# Patient Record
Sex: MALE | Race: WHITE | Employment: FULL TIME | URBAN - METROPOLITAN AREA
[De-identification: names, ages, dates, MRNs, and addresses within clinical notes are randomized per-mention and may not be internally consistent; named-entity substitution may affect disease eponyms.]

---

## 2019-03-10 ENCOUNTER — APPOINTMENT (OUTPATIENT)
Dept: MRI IMAGING | Facility: CLINIC | Age: 39
End: 2019-03-10
Attending: NURSE PRACTITIONER
Payer: COMMERCIAL

## 2019-03-10 ENCOUNTER — APPOINTMENT (OUTPATIENT)
Dept: CT IMAGING | Facility: CLINIC | Age: 39
End: 2019-03-10
Attending: EMERGENCY MEDICINE
Payer: COMMERCIAL

## 2019-03-10 ENCOUNTER — HOSPITAL ENCOUNTER (OUTPATIENT)
Facility: CLINIC | Age: 39
Setting detail: OBSERVATION
Discharge: HOME | End: 2019-03-11
Attending: EMERGENCY MEDICINE | Admitting: INTERNAL MEDICINE
Payer: COMMERCIAL

## 2019-03-10 DIAGNOSIS — R47.89 WORD FINDING DIFFICULTY: ICD-10-CM

## 2019-03-10 DIAGNOSIS — G45.9 TIA (TRANSIENT ISCHEMIC ATTACK): Primary | ICD-10-CM

## 2019-03-10 DIAGNOSIS — H53.9 VISUAL DISTURBANCE: ICD-10-CM

## 2019-03-10 LAB
ANION GAP SERPL CALCULATED.3IONS-SCNC: 5 MMOL/L (ref 3–14)
APTT PPP: 25 SEC (ref 22–37)
BASOPHILS # BLD AUTO: 0 10E9/L (ref 0–0.2)
BASOPHILS NFR BLD AUTO: 0.2 %
BUN SERPL-MCNC: 13 MG/DL (ref 7–30)
CALCIUM SERPL-MCNC: 8.8 MG/DL (ref 8.5–10.1)
CHLORIDE SERPL-SCNC: 105 MMOL/L (ref 94–109)
CO2 SERPL-SCNC: 29 MMOL/L (ref 20–32)
CREAT SERPL-MCNC: 0.77 MG/DL (ref 0.66–1.25)
DIFFERENTIAL METHOD BLD: NORMAL
EOSINOPHIL # BLD AUTO: 0.2 10E9/L (ref 0–0.7)
EOSINOPHIL NFR BLD AUTO: 2.4 %
ERYTHROCYTE [DISTWIDTH] IN BLOOD BY AUTOMATED COUNT: 12.8 % (ref 10–15)
GFR SERPL CREATININE-BSD FRML MDRD: >90 ML/MIN/{1.73_M2}
GLUCOSE BLDC GLUCOMTR-MCNC: 87 MG/DL (ref 70–99)
GLUCOSE BLDC GLUCOMTR-MCNC: 91 MG/DL (ref 70–99)
GLUCOSE SERPL-MCNC: 95 MG/DL (ref 70–99)
HBA1C MFR BLD: 5.7 % (ref 0–5.6)
HCT VFR BLD AUTO: 44.7 % (ref 40–53)
HGB BLD-MCNC: 15.8 G/DL (ref 13.3–17.7)
IMM GRANULOCYTES # BLD: 0.1 10E9/L (ref 0–0.4)
IMM GRANULOCYTES NFR BLD: 1.1 %
INR PPP: 1 (ref 0.86–1.14)
INTERPRETATION ECG - MUSE: NORMAL
LYMPHOCYTES # BLD AUTO: 3.2 10E9/L (ref 0.8–5.3)
LYMPHOCYTES NFR BLD AUTO: 32.9 %
MCH RBC QN AUTO: 31.5 PG (ref 26.5–33)
MCHC RBC AUTO-ENTMCNC: 35.3 G/DL (ref 31.5–36.5)
MCV RBC AUTO: 89 FL (ref 78–100)
MONOCYTES # BLD AUTO: 0.7 10E9/L (ref 0–1.3)
MONOCYTES NFR BLD AUTO: 7.3 %
NEUTROPHILS # BLD AUTO: 5.5 10E9/L (ref 1.6–8.3)
NEUTROPHILS NFR BLD AUTO: 56.1 %
NRBC # BLD AUTO: 0 10*3/UL
NRBC BLD AUTO-RTO: 0 /100
PLATELET # BLD AUTO: 238 10E9/L (ref 150–450)
POTASSIUM SERPL-SCNC: 3.9 MMOL/L (ref 3.4–5.3)
RBC # BLD AUTO: 5.01 10E12/L (ref 4.4–5.9)
SODIUM SERPL-SCNC: 139 MMOL/L (ref 133–144)
TROPONIN I SERPL-MCNC: <0.015 UG/L (ref 0–0.04)
TSH SERPL DL<=0.005 MIU/L-ACNC: 2.25 MU/L (ref 0.4–4)
WBC # BLD AUTO: 9.8 10E9/L (ref 4–11)

## 2019-03-10 PROCEDURE — A9585 GADOBUTROL INJECTION: HCPCS | Performed by: INTERNAL MEDICINE

## 2019-03-10 PROCEDURE — 93005 ELECTROCARDIOGRAM TRACING: CPT

## 2019-03-10 PROCEDURE — G0378 HOSPITAL OBSERVATION PER HR: HCPCS

## 2019-03-10 PROCEDURE — 84484 ASSAY OF TROPONIN QUANT: CPT | Performed by: EMERGENCY MEDICINE

## 2019-03-10 PROCEDURE — 00000146 ZZHCL STATISTIC GLUCOSE BY METER IP

## 2019-03-10 PROCEDURE — 84443 ASSAY THYROID STIM HORMONE: CPT | Performed by: EMERGENCY MEDICINE

## 2019-03-10 PROCEDURE — 25000125 ZZHC RX 250: Performed by: EMERGENCY MEDICINE

## 2019-03-10 PROCEDURE — 99214 OFFICE O/P EST MOD 30 MIN: CPT | Performed by: PSYCHIATRY & NEUROLOGY

## 2019-03-10 PROCEDURE — 99220 ZZC INITIAL OBSERVATION CARE,LEVL III: CPT | Performed by: NURSE PRACTITIONER

## 2019-03-10 PROCEDURE — 83036 HEMOGLOBIN GLYCOSYLATED A1C: CPT | Performed by: EMERGENCY MEDICINE

## 2019-03-10 PROCEDURE — 70498 CT ANGIOGRAPHY NECK: CPT

## 2019-03-10 PROCEDURE — 99285 EMERGENCY DEPT VISIT HI MDM: CPT | Mod: 25

## 2019-03-10 PROCEDURE — 70553 MRI BRAIN STEM W/O & W/DYE: CPT

## 2019-03-10 PROCEDURE — 25000132 ZZH RX MED GY IP 250 OP 250 PS 637: Performed by: NURSE PRACTITIONER

## 2019-03-10 PROCEDURE — 25500064 ZZH RX 255 OP 636: Performed by: INTERNAL MEDICINE

## 2019-03-10 PROCEDURE — 25000128 H RX IP 250 OP 636: Performed by: EMERGENCY MEDICINE

## 2019-03-10 PROCEDURE — 85025 COMPLETE CBC W/AUTO DIFF WBC: CPT | Performed by: EMERGENCY MEDICINE

## 2019-03-10 PROCEDURE — 85730 THROMBOPLASTIN TIME PARTIAL: CPT | Performed by: EMERGENCY MEDICINE

## 2019-03-10 PROCEDURE — 70450 CT HEAD/BRAIN W/O DYE: CPT | Mod: 59

## 2019-03-10 PROCEDURE — 85610 PROTHROMBIN TIME: CPT | Performed by: EMERGENCY MEDICINE

## 2019-03-10 PROCEDURE — 80048 BASIC METABOLIC PNL TOTAL CA: CPT | Performed by: EMERGENCY MEDICINE

## 2019-03-10 PROCEDURE — 0042T CT HEAD PERFUSION WITH CONTRAST: CPT

## 2019-03-10 RX ORDER — HYDRALAZINE HYDROCHLORIDE 20 MG/ML
10-20 INJECTION INTRAMUSCULAR; INTRAVENOUS
Status: DISCONTINUED | OUTPATIENT
Start: 2019-03-10 | End: 2019-03-11 | Stop reason: HOSPADM

## 2019-03-10 RX ORDER — ASPIRIN 81 MG/1
81 TABLET ORAL DAILY
Status: DISCONTINUED | OUTPATIENT
Start: 2019-03-10 | End: 2019-03-10

## 2019-03-10 RX ORDER — LABETALOL 20 MG/4 ML (5 MG/ML) INTRAVENOUS SYRINGE
10-40 EVERY 10 MIN PRN
Status: DISCONTINUED | OUTPATIENT
Start: 2019-03-10 | End: 2019-03-11 | Stop reason: HOSPADM

## 2019-03-10 RX ORDER — NALOXONE HYDROCHLORIDE 0.4 MG/ML
.1-.4 INJECTION, SOLUTION INTRAMUSCULAR; INTRAVENOUS; SUBCUTANEOUS
Status: DISCONTINUED | OUTPATIENT
Start: 2019-03-10 | End: 2019-03-11 | Stop reason: HOSPADM

## 2019-03-10 RX ORDER — AMOXICILLIN 250 MG
1-2 CAPSULE ORAL 2 TIMES DAILY PRN
Status: DISCONTINUED | OUTPATIENT
Start: 2019-03-10 | End: 2019-03-11 | Stop reason: HOSPADM

## 2019-03-10 RX ORDER — ASPIRIN 81 MG/1
81 TABLET ORAL DAILY
Status: DISCONTINUED | OUTPATIENT
Start: 2019-03-11 | End: 2019-03-11 | Stop reason: HOSPADM

## 2019-03-10 RX ORDER — FEXOFENADINE HCL 180 MG/1
180 TABLET ORAL DAILY PRN
COMMUNITY

## 2019-03-10 RX ORDER — LANOLIN ALCOHOL/MO/W.PET/CERES
3 CREAM (GRAM) TOPICAL
COMMUNITY

## 2019-03-10 RX ORDER — IOPAMIDOL 755 MG/ML
120 INJECTION, SOLUTION INTRAVASCULAR ONCE
Status: COMPLETED | OUTPATIENT
Start: 2019-03-10 | End: 2019-03-10

## 2019-03-10 RX ORDER — LANOLIN ALCOHOL/MO/W.PET/CERES
3 CREAM (GRAM) TOPICAL
Status: DISCONTINUED | OUTPATIENT
Start: 2019-03-10 | End: 2019-03-11 | Stop reason: HOSPADM

## 2019-03-10 RX ORDER — GADOBUTROL 604.72 MG/ML
9 INJECTION INTRAVENOUS ONCE
Status: COMPLETED | OUTPATIENT
Start: 2019-03-10 | End: 2019-03-10

## 2019-03-10 RX ADMIN — IOPAMIDOL 120 ML: 755 INJECTION, SOLUTION INTRAVENOUS at 16:39

## 2019-03-10 RX ADMIN — SODIUM CHLORIDE 100 ML: 9 INJECTION, SOLUTION INTRAVENOUS at 16:39

## 2019-03-10 RX ADMIN — GADOBUTROL 9 ML: 604.72 INJECTION INTRAVENOUS at 20:08

## 2019-03-10 RX ADMIN — ASPIRIN 325 MG: 325 TABLET, DELAYED RELEASE ORAL at 19:30

## 2019-03-10 ASSESSMENT — MIFFLIN-ST. JEOR
SCORE: 1864.3
SCORE: 1865.66

## 2019-03-10 NOTE — ED NOTES
"[facility]  ED Nurse Handoff Report    ED Chief complaint: Eye Problem (reports he became dizzy and had vision changes along with diifficulty organizing thoughts to answer questions. Onset 1500)      ED Diagnosis:   Final diagnoses:   None       Code Status: Full Code    Allergies: Not on File    Activity level - Baseline/Home:  Independent    Activity Level - Current:   Stand with Assist     Needed?: No    Isolation: No  Infection: Not Applicable  Bariatric?: No    Vital Signs:   Vitals:    03/10/19 1618 03/10/19 1622 03/10/19 1628 03/10/19 1648   BP: (!) 149/97 144/79  137/71   Pulse:  83  87   Resp: 16 15 18 18   Temp: 97.7  F (36.5  C)      TempSrc: Oral      SpO2: 98% 99% 99% 97%   Weight: 96.6 kg (213 lb)      Height: 1.727 m (5' 8\")          Cardiac Rhythm: ,        Pain level: 0-10 Pain Scale: 0    Is this patient confused?: No   Does this patient have a guardian?  No         If yes, is there guardianship documents in the Epic \"Code/ACP\" activity?  N/A         Guardian Notified?  N/A  [place] - Suicide Severity Rating Scale Completed?  Yes  If yes, what color did the patient score?  White    Patient Report: Initial Complaint: vision changes and difficulty organizing thoughts to answer questions  Focused Assessment  James Tran is a 38 year old male who presents to the emergency department today for evaluation of eye problem. The patient reports driving with his partner and suddenly at 1500 became dizzy and had visual disturbance. He describes the disturbance as his vision moving top to bottom constantly. He notes they pulled over and after 30-40 second rest his vision became normal. He went to urgent care and when he was asked  questions he notes he understood the questions very well but could not speak and express his answers. The patient denies any weakness or pain. His symptoms have cleared now, he was sent directly from urgent care to ED where code stroke was initiated. "   Tests performed- labs, head CT, MRI ordered    Abnormal Results:   Results for orders placed or performed during the hospital encounter of 03/10/19   CT Head w/o Contrast    Narrative    Clinical History: Code Stroke. Vision problems. Work finding  difficulty.    Technique: Technique: Noncontrast axial CT images of the head were  obtained. This CT Scan used dose modulation, iterative reconstruction,  and/or weight based dosing when appropriate to reduce radiation dose  to as low as reasonably achievable.    Comparison:  None.     Findings:  The ventricles and sulci are normal.   No parenchymal  lesion is identified . No evidence of hemorrhage, mass or recent  infarction.  The cerebellum and brainstem are unremarkable. The globes  and orbits are unremarkable.  The imaged portions of the paranasal  sinuses and mastoid air cells are clear.  The skull base and calvaria  are intact.        Impression    Impression:  Normal head CT.     Case discussed with Dr. Estes 3/10/20 at 1916:55    OMAR JENKINS MD   Basic metabolic panel   Result Value Ref Range    Sodium 139 133 - 144 mmol/L    Potassium 3.9 3.4 - 5.3 mmol/L    Chloride 105 94 - 109 mmol/L    Carbon Dioxide PENDING 20 - 32 mmol/L    Anion Gap PENDING 3 - 14 mmol/L    Glucose PENDING 70 - 99 mg/dL    Urea Nitrogen PENDING 7 - 30 mg/dL    Creatinine PENDING 0.66 - 1.25 mg/dL    GFR Estimate PENDING >60 mL/min/[1.73_m2]    GFR Estimate If Black PENDING >60 mL/min/[1.73_m2]    Calcium PENDING 8.5 - 10.1 mg/dL   Glucose by meter   Result Value Ref Range    Glucose 91 70 - 99 mg/dL   EKG 12 lead   Result Value Ref Range    Interpretation ECG Click View Image link to view waveform and result        Treatments provided: education provided on stroke protocol    Family Comments: partner at bedside    OBS brochure/video discussed/provided to patient/family: No              Name of person given brochure if not patient: n/a              Relationship to patient: n/a    ED  Medications:   Medications   iopamidol (ISOVUE-370) solution 120 mL (120 mLs Intravenous Given 3/10/19 1639)   100mL Saline flush (100 mLs Intravenous Given 3/10/19 1639)       Drips infusing?:  No    For the majority of the shift this patient was Green.   Interventions performed were n/a.    Severe Sepsis OR Septic Shock Diagnosis Present: No    To be done/followed up on inpatient unit:  possibly MRI if not yet done    ED NURSE PHONE NUMBER: *[id number]

## 2019-03-10 NOTE — ED PROVIDER NOTES
"  History     Chief Complaint:  \"I'm off\"      HPI   James Tran is a 38 year old male who presents to the emergency department today for evaluation of visual disturbance and speech trouble. The patient reports driving with his partner and suddenly at 1500 became dizzy and had visual disturbance, \"like when you're going around a roudabout, . He describes the disturbance as his vision moving top to bottom constantly. He notes they pulled over and after 30-40 second rest his vision improved but did not normalize.   When he sought medical evaluation and they asked him questions, he notes he understood the questions very well but could not speak and express his answers.  He states that he was unable to spell his name, as he felt he could not get the words out.  The patient denies any focal weakness or pain.  No history of heart arrhythmias or history of stroke.  No history of diabetes.  No drugs or alcohol.  This has never happened in the past.  No eye pain.    Cardiac/PE/DVT Risk Factors:  History of hypertension - Negative   History of diabetes - Negative   History of smoking - negative   Personal history of PE/DVT - negative   Personal history of heart complications - negative   Recent travel - negative     Allergies:  No Known Drug Allergies     Medications:    Medications reviewed. No pertinent medications.    Past Medical History:    Past medical history reviewed. No pertinent medical history.     Past Surgical History:    Surgical history reviewed. No pertinent surgical history.     Family History:    Mother: TIAs    Social History:  The patient was accompanied to the ED by wife.  Smoking Status: Never Smoker  Smokeless Tobacco: Never Used  Alcohol Use: Positive  Drug Use: Negative  Marital Status:         Review of Systems   Unable to perform ROS: Acuity of condition       Physical Exam     Patient Vitals for the past 24 hrs:   BP Temp Temp src Pulse Heart Rate Resp SpO2 Height Weight   03/10/19 1829 " "125/74 98.6  F (37  C) Oral 70 70 14 98 % -- --   03/10/19 1800 120/68 -- -- 73 73 13 96 % -- --   03/10/19 1700 133/68 -- -- 75 78 14 98 % -- --   03/10/19 1648 137/71 -- -- 87 86 18 97 % -- --   03/10/19 1628 -- -- -- -- 80 18 99 % -- --   03/10/19 1622 144/79 -- -- 83 81 15 99 % -- --   03/10/19 1618 (!) 149/97 97.7  F (36.5  C) Oral -- 85 16 98 % 1.727 m (5' 8\") 96.6 kg (213 lb)        Physical Exam  General: male semi-recumbent in Stabe 1, female  at bedside  HENT: mucous membranes moist, OP clear, face nontender  Eyes: PERRL, no nystagmus  CV: regular rate, regular rhythm  Resp: clear throughout, normal effort  GI: abdomen soft and nontender, no guarding  MSK: no bony tenderness  Skin: appropriately warm and dry  Neuro: awake, alert, clear speech, fully oriented, face symmetric,  normal, finger-nose normal bilaterally, strength and sensation intact in all extr, no meningismus, ambulation not initially tested  Psych: anxious, cooperative      Emergency Department Course     ECG:  ECG taken at 1617, ECG read at 1620  Normal sinus rhythm  Rate 86 bpm. FL interval 134 ms. QRS duration 94 ms. QT/QTc 390/466 ms. P-R-T axes 57 19 38.    Imaging:  Radiology findings were communicated with the patient who voiced understanding of the findings.    CTA Head Neck with Contrast  1. No carotid or vertebral artery stenosis or dissection.  2. Intracranial circulation is unremarkable.  OMAR JENKINS MD  Reading per radiology     CT Head Perfusion w Contrast  Normal CT perfusion of the brain.  Radiation dose for this scan was reduced using automated exposure  control, adjustment of the mA and/or kV according to patient size, or  iterative reconstruction technique  OMAR JENKINS MD  Reading per radiology    CT Head w/o Contrast  Normal head CT.   Case discussed with Dr. Estes 3/10/20 at 1916:55  OMAR JENKINS MD  Reading per radiology    Laboratory:  Laboratory findings were communicated with the patient who voiced " understanding of the findings.    CBC: WBC 9.8, HGB 15.8,   BMP: o/w WNL (Creatinine 0.77)  INR: 1.00  Partial thromboplastin time: 25  Glucose by meter: 91    Interventions:  1639 ISOVUE 120 ml IV  1639 Saline flush 100 ml IV    Emergency Department Course:    1607 Patient arrived in ED    1615 Nursing notes and vitals reviewed.    1615 I performed an exam of the patient as documented above.     Code stroke activated.    1617 EKG obtained as noted above.     The patient was placed on continuous cardiac and pulse ox monitoring.    1625 Patient rechecked and updated.  Neurology is at Bedside. IV was inserted and blood was drawn for laboratory testing, results above.     1635  The patient was sent for a CT head w/o contrast, CT head perfusion w/ contrast, and CTA head neck w/ contrast while in the emergency department, results above.      1654 I spoke with Dr. Qureshi of the radiology service from [facility] regarding patient's presentation, findings, and plan of care.     1703 Patient rechecked and updated.      1825 I personally reviewed the EKG, laboratory, and imaging results with the patient and answered all related questions prior to admit.    Impression & Plan      Medical Decision Making:  Given very recent onset of focal neurologic symptoms, code stroke was activated.  Prompt imaging was performed though fortunately there is no evidence of structural intracranial process or thrombus on angiography.  Therefore the code stroke was de-escalated, while acknowledging an acute ischemic stroke remains possible.  No signs of major metabolic ab normality.  Symptoms ultimately resolved during his ED course.  Neurology team recommends hospitalization for close monitoring overnight and nonemergent MRI.  He will be hospitalized for further care.    Diagnosiis:    ICD-10-CM    1. Visual disturbance H53.9 Basic metabolic panel     CBC with platelets differential     INR     Partial thromboplastin time   2. Word  finding difficulty R47.89      Disposition:   The patient is admitted into the care of Dr. Henriquez.     Scribe Disclosure:  I, Winston Tiny, am serving as a scribe at 4:18 PM on 3/10/2019 to document services personally performed by Domenico Estes MD based on my observations and the provider's statements to me.    This record was created at least in part using electronic voice recognition software, so please excuse any typographical errors.          EMERGENCY DEPARTMENT       Domenico Estes MD  03/10/19 [id number]

## 2019-03-10 NOTE — PROGRESS NOTES
RECEIVING UNIT ED HANDOFF REVIEW    ED Nurse Handoff Report was reviewed by: Rajeev Gleason on March 10, 2019 at 6:06 PM

## 2019-03-10 NOTE — CONSULTS
[facility]      Stroke Consult Note    Reason for Consult:  Stroke Code    HPI  James Tran is a 38 year old male who presents with vision changes while driving at 3 pm today. He states that the vision was constantly going up and down for about 30 seconds then resolved. He never lost vision during that time. He then felt off for awhile and could not answer questions that people were asking him for about 1 hour. He was able to understand everybody though. He now feels back to normal without any complaints.    He has never had similar symptoms in the past. He denied any weakness, numbness, chest pain, headache.    TPA Treatment   Not given due to minor / isolated / quickly resolving stroke symptoms.    Endovascular Treatment  Not initiated due to absence of proximal vessel occlusion    Impression  Vision changes/trouble speaking ?TIA/Ischemic stroke    Recommendations  -No quite sure by symptomology if this was TIA/Ischemic stroke  -Aspirin 325mg once daily  -Neuro checks  -MRI brain for further evaluation  -TTE, EKG, Tele  -LDL, HbA1C    Patient Follow-up    - final recommendation pending work-up      Please contact the Stroke Service with any questions.    Tiago Wilde MD  Neurology  March 10, 2019    Text Page ([phone])    __________________________________________________    History reviewed. No pertinent past medical history.    History reviewed. No pertinent surgical history.    Medications   No current facility-administered medications for this encounter.      No current outpatient medications on file.         (Not in a hospital admission)    Allergies   Not on File    Family History       Social History   Social History     Socioeconomic History     Marital status:      Spouse name: Not on file     Number of children: Not on file     Years of education: Not on file     Highest education level: Not on file   Occupational History     Not on file   Social Needs     Financial resource strain:  Not on file     Food insecurity:     Worry: Not on file     Inability: Not on file     Transportation needs:     Medical: Not on file     Non-medical: Not on file   Tobacco Use     Smoking status: Never Smoker     Smokeless tobacco: Never Used   Substance and Sexual Activity     Alcohol use: Yes     Comment: social     Drug use: No     Sexual activity: Yes     Partners: Female   Lifestyle     Physical activity:     Days per week: Not on file     Minutes per session: Not on file     Stress: Not on file   Relationships     Social connections:     Talks on phone: Not on file     Gets together: Not on file     Attends Jewish service: Not on file     Active member of club or organization: Not on file     Attends meetings of clubs or organizations: Not on file     Relationship status: Not on file     Intimate partner violence:     Fear of current or ex partner: Not on file     Emotionally abused: Not on file     Physically abused: Not on file     Forced sexual activity: Not on file   Other Topics Concern     Not on file   Social History Narrative     Not on file          ROS  The 10 point Review of Systems is negative other than noted in the HPI or here.     PHYSICAL EXAMINATION  B/P:     (!) 149/97 (03/10/19 1618)    Heart Rate: 85 (03/10/19 1618)        Resp:  16 (03/10/19 1618)  Temp: 97.7  F (36.5  C)   Oral (03/10/19 1618)    General:  patient lying in bed without any acute distress    HEENT:  normocephalic/atraumatic  Cardio:  RRR  Pulmonary:  no respiratory distress  Abdomen:  soft  Extremities:  no edema  Skin:  intact     Neurologic  Mental Status:  fully alert, attentive and oriented, follows commands, speech clear and fluent  Cranial Nerves:  visual fields intact, PERRL, EOMI with normal smooth pursuit, facial sensation intact and symmetric, facial movements symmetric, no dysarthria  Motor:  strength 5/5 throughout upper and lower extremities  Reflexes:  no clonus  Sensory:  intact/symmetric to light touch  and pin prick throughout upper and lower extremities  Coordination:  FNF and HS intact without dysmetria  Station/Gait:  No tested    Stroke Scales    NIHSS  Interval and Comments baseline   1a. Level of Consciousness 0-->Alert, keenly responsive   1b. LOC Questions 0-->Answers both questions correctly   1c. LOC Commands 0-->Performs both tasks correctly   2.   Best Gaze 0-->Normal   3.   Visual 0-->No visual loss(patient had a brief episode at 1500 today where he felt he had a change in his normal vision)   4.   Facial Palsy 0-->Normal symmetrical movements   5a. Motor Arm, Left 0-->No drift, limb holds 90 (or 45) degrees for full 10 secs   5b. Motor Arm, Right 0-->No drift, limb holds 90 (or 45) degrees for full 10 secs   6a. Motor Leg, Left 0-->No drift, leg holds 30 degree position for full 5 secs   6b. Motor Leg, right 0-->No drift, leg holds 30 degree position for full 5 secs   7.   Limb Ataxia 0-->Absent   8.   Sensory 0-->Normal, no sensory loss   9.   Best Language 0-->No aphasia, normal(at 1500 today patient says he knew what was being asked of him but was unable to give a verbal response. )   10. Dysarthria 0-->Normal   11. Extinction and Inattention  0-->No abnormality   Total 0       Labs/Imaging  Labs and imaging were reviewed and used in developing plan; pertinent results included.  Data   CBC  No results found for: WBC No results found for: RBC No results found for: HGB   No results found for: HCT No results found for: PLT      BMP  No results found for: NA No results found for: POTASSIUM No results found for: CHLORIDE   No results found for: CO2 No results found for: GLC No results found for: BUN   No results found for: CR No results found for: AKUA      INR Troponin I A1C   No results found for: INR No results found for: TROPI No results found for: A1C     Liver Panel  No results found for: PROTTOTAL No results found for: ALBUMIN No results found for: BILITOTAL   No results found for: ALKPHOS No  results found for: AST No results found for: ALT   No results found for: BILIDIRECT       Lipid Profile  No results found for: CHOL No results found for: HDL No results found for: LDL   No results found for: TRIG No results found for: CHOLHDLRATIO           I have personally spent a total of 50 minutes providing care and consulting with this patient's medical providers today, with more than 50% of this time spent in consultation, coordination of care, and discussion with the patient and/or family regarding diagnostic results, prognosis, symptom management, risks and benefits of management options, and development of plan of care.     Stroke Code Data  (for stroke code without tele)  Stroke code activated 03/10/19   1516   First stroke provider response 03/10/19   1520   Last known normal 03/10/19   1500   Time of discovery   (or onset of symptoms) 03/10/19   1500   Head CT read by me 03/10/19   1640   Was stroke code de-escalated? Yes 03/10/19 1640

## 2019-03-10 NOTE — PHARMACY-ADMISSION MEDICATION HISTORY
Admission medication history interview status for the 3/10/2019  admission is complete. See EPIC admission navigator for prior to admission medications     Medication history source reliability:Good    Actions taken by pharmacist (provider contacted, etc):None     Additional medication history information not noted on PTA med list :None    Medications added: all (initial PTA med list was blank)    Medication reconciliation/reorder completed by provider prior to medication history? No    Time spent in this activity: 10 minutes    Prior to Admission medications    Medication Sig Last Dose Taking? Auth Provider   fexofenadine (ALLEGRA) 180 MG tablet Take 180 mg by mouth daily as needed for allergies  at prn Yes Unknown, Entered By History   melatonin 3 MG tablet Take 3 mg by mouth nightly as needed for sleep  at prn Yes Unknown, Entered By History

## 2019-03-11 ENCOUNTER — APPOINTMENT (OUTPATIENT)
Dept: CARDIOLOGY | Facility: CLINIC | Age: 39
End: 2019-03-11
Attending: NURSE PRACTITIONER
Payer: COMMERCIAL

## 2019-03-11 VITALS
HEIGHT: 68 IN | BODY MASS INDEX: 32.33 KG/M2 | DIASTOLIC BLOOD PRESSURE: 66 MMHG | WEIGHT: 213.3 LBS | OXYGEN SATURATION: 95 % | RESPIRATION RATE: 16 BRPM | TEMPERATURE: 96.9 F | HEART RATE: 70 BPM | SYSTOLIC BLOOD PRESSURE: 113 MMHG

## 2019-03-11 PROBLEM — R46.89 SPELL OF ABNORMAL BEHAVIOR: Status: ACTIVE | Noted: 2019-03-10

## 2019-03-11 LAB
CHOLEST SERPL-MCNC: 165 MG/DL
HDLC SERPL-MCNC: 37 MG/DL
LDLC SERPL CALC-MCNC: 104 MG/DL
NONHDLC SERPL-MCNC: 128 MG/DL
TRIGL SERPL-MCNC: 119 MG/DL

## 2019-03-11 PROCEDURE — 99214 OFFICE O/P EST MOD 30 MIN: CPT | Performed by: PSYCHIATRY & NEUROLOGY

## 2019-03-11 PROCEDURE — 80061 LIPID PANEL: CPT | Performed by: NURSE PRACTITIONER

## 2019-03-11 PROCEDURE — G0378 HOSPITAL OBSERVATION PER HR: HCPCS

## 2019-03-11 PROCEDURE — 93306 TTE W/DOPPLER COMPLETE: CPT | Mod: 26 | Performed by: INTERNAL MEDICINE

## 2019-03-11 PROCEDURE — 36415 COLL VENOUS BLD VENIPUNCTURE: CPT | Performed by: NURSE PRACTITIONER

## 2019-03-11 PROCEDURE — 40000264 ECHOCARDIOGRAM COMPLETE

## 2019-03-11 PROCEDURE — 25500064 ZZH RX 255 OP 636: Performed by: INTERNAL MEDICINE

## 2019-03-11 PROCEDURE — 99217 ZZC OBSERVATION CARE DISCHARGE: CPT | Performed by: PHYSICIAN ASSISTANT

## 2019-03-11 PROCEDURE — 25000132 ZZH RX MED GY IP 250 OP 250 PS 637: Performed by: NURSE PRACTITIONER

## 2019-03-11 RX ORDER — ASPIRIN 325 MG
325 TABLET, DELAYED RELEASE (ENTERIC COATED) ORAL DAILY
Qty: 30 TABLET | Refills: 0 | Status: SHIPPED | OUTPATIENT
Start: 2019-03-11

## 2019-03-11 RX ADMIN — ASPIRIN 81 MG: 81 TABLET, COATED ORAL at 08:07

## 2019-03-11 RX ADMIN — HUMAN ALBUMIN MICROSPHERES AND PERFLUTREN 5 ML: 10; .22 INJECTION, SOLUTION INTRAVENOUS at 10:35

## 2019-03-11 ASSESSMENT — MIFFLIN-ST. JEOR: SCORE: 1867.02

## 2019-03-11 NOTE — PROGRESS NOTES
Observation goals PRIOR TO DISCHARGE    Comments: List all goals to be met before discharge home       Free from ACUTE neuro deficits : Met     Testing complete : Not met, echo      Other: neuro to see today

## 2019-03-11 NOTE — PLAN OF CARE
A&O. Neuros intact. VSS. Tele SB, occasional PAC. Regular diet. Up independently. Denies pain. Plan for echo today and will see neuro.

## 2019-03-11 NOTE — PLAN OF CARE
Pt arrived from ED at around 1830hrs. A/OX4. Tele: SR w/ RENE,s.VSS on RA. NIH and neuros intact. Denies pain. MRI and orthostatic, negative. Echo in the AM.Aspirin given as ordered. Independent.

## 2019-03-11 NOTE — DISCHARGE INSTRUCTIONS
A follow-up appointment was scheduled for you [see above].  It is very important to go to it--bring these papers and your insurance card with you.  At the visit, talk about your hospital stay.  Tell your doctor how you feel.  Show your new list of medications.  Your doctor will update records, make sure you are still doing OK, and decide if any tests or medication changes are needed.        * If you would like to establish care at a [facility] (for primary care services), The clinic closest to you is:   [facility] ([phone]), Address: [address] [place] [address] [place] [address]         Your risk factors for stroke or TIA (transient ischemic attack):    Your Risk Factors Your Results Normal Ranges   High blood pressure BP Readings from Last 1 Encounters:   03/11/19 113/66    Less than 120/80   Cholesterol              Total Lab Results   Component Value Date    CHOL 165 03/11/2019      Less than 150    Triglycerides   Lab Results   Component Value Date    TRIG 119 03/11/2019    Less than 150   LDL Lab Results   Component Value Date     03/11/2019       Less than 70   HDL Lab Results   Component Value Date    HDL 37 03/11/2019            Greater than 40 (men)  Greater than 50 (women)   Diabetes Recent Labs   Lab 03/10/19  1625   GLC 95    Fasting blood glucose    Smoking/tobacco use Former smoker Quit smoking and tobacco   Overweight Current weight 96.8kg  Lose 1-2 pounds a week   Lack of exercise Not documented 30 minutes moderate activity each day   Other risk factors include carotid (neck) artery disease, atrial fibrillation and stress. You may be on new medicine to treat high blood pressure, cholesterol, diabetes or atrial fibrillation.  Understanding Stroke Booklet given to patient. Please refer to booklet for further information.    Stroke warning signs and symptoms - CALL 911 right away for:  - Sudden numbness or weakness in the face, arm or leg (often on one side of the  body).  - Sudden confusion or trouble understanding what is going on.  - Sudden blurred or decreased vision in one or both eyes.  - Sudden trouble speaking, loss of balance, dizziness or problems with coordination.  - Sudden, severe headache for no reason.  - Fainting or seizures.  - Symptoms may go away then come back suddenly.
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
## 2019-03-11 NOTE — PROGRESS NOTES
Observation goals PRIOR TO DISCHARGE    Comments: List all goals to be met before discharge home       Free from ACUTE neuro deficits : Met    Testing complete : Not met    Other:

## 2019-03-11 NOTE — PROGRESS NOTES
Observation goals PRIOR TO DISCHARGE    Comments: List all goals to be met before discharge home       Free from ACUTE neuro deficits : Met     Testing complete : Met.  Echo bubble study test completed and resulted.      Other: neuro to see today.  Not met.     Discharge pending.

## 2019-03-11 NOTE — PROGRESS NOTES
"Care Coordination:    Met with patient in room, introduced self and role.  I anticipate pt will have neurology follow up recommendations (awaiting today's return consult).  I added [facility] contact information to discharge papers.     [facility]              [address] , [address]              [place], [place] [id number] [phone]    Addendum: scheduled appointment:     Follow up with Neurology in 4-6 weeks, scheduled:  Tues April 16 at 3pm with Sandra TRUJILLO (Dr. Wagner)     at [facility] [phone]     [address], [address], [place] [address]      Clarified pt's PCP.  It is \"[facility]\" (not \"my care\") attached to his employer ([employer] in [facility].  He states he saw a NP there once (NPs are available daily), and there is a MD twice per week and lab services.      [facility]  Address: [address] [place] [address], [address], [address]  Phone: [phone]  Fax: [phone]  Email: [email or web address]  Hours:   Monday:  8:00 a.m. - 3:00 p.m.  Tuesday: 7:00 a.m. - 3:00 p.m.  Wednesday: 8:00 a.m. - 3:00 p.m.  Thursday: 9:00 a.m. - 5:00 p.m.  Friday: 7:00 a.m. - 12:00 p.m.  Lab Hours  Monday: 7:00 a.m. - 9 a.m.  Tuesday: 7:00 a.m. - 9 a.m.    Also added [facility] info to [facility], in case pt would like to establish care there. The [facility] site describes their care model as, \"employees have quick and easy access to primary care and treatment for minor health conditions.\"  Pt plans to call to find out whether they would are able to handle followup.      Kandace Jean RN, BSN  [facility] Care Coordinator   Mobile Phone: [phone]    "

## 2019-03-11 NOTE — PLAN OF CARE
RN:   Patient discharged to home.  A/O x4.  VSS on RA.  Observation goals met.  Consults and testing completed.  Patient following up with neurology as an outpatient.  Up IND.  AVS instructions and medications reviewed with the patient and his wife.  Patient advised not to drive until seen and cleared by Neurology.
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
## 2019-03-13 ENCOUNTER — TRANSFERRED RECORDS (OUTPATIENT)
Dept: HEALTH INFORMATION MANAGEMENT | Facility: CLINIC | Age: 39
End: 2019-03-13

## 2019-03-18 DIAGNOSIS — R06.83 SNORING: Primary | ICD-10-CM

## 2019-03-18 DIAGNOSIS — R68.89 EPISODES OF DECREASED ATTENTIVENESS: ICD-10-CM

## 2020-03-11 ENCOUNTER — HEALTH MAINTENANCE LETTER (OUTPATIENT)
Age: 40
End: 2020-03-11

## 2021-01-03 ENCOUNTER — HEALTH MAINTENANCE LETTER (OUTPATIENT)
Age: 41
End: 2021-01-03

## 2021-04-25 ENCOUNTER — HEALTH MAINTENANCE LETTER (OUTPATIENT)
Age: 41
End: 2021-04-25

## 2021-10-10 ENCOUNTER — HEALTH MAINTENANCE LETTER (OUTPATIENT)
Age: 41
End: 2021-10-10

## 2022-05-21 ENCOUNTER — HEALTH MAINTENANCE LETTER (OUTPATIENT)
Age: 42
End: 2022-05-21

## 2022-09-17 ENCOUNTER — HEALTH MAINTENANCE LETTER (OUTPATIENT)
Age: 42
End: 2022-09-17

## 2023-06-04 ENCOUNTER — HEALTH MAINTENANCE LETTER (OUTPATIENT)
Age: 43
End: 2023-06-04